# Patient Record
Sex: MALE | Employment: FULL TIME | URBAN - METROPOLITAN AREA
[De-identification: names, ages, dates, MRNs, and addresses within clinical notes are randomized per-mention and may not be internally consistent; named-entity substitution may affect disease eponyms.]

---

## 2024-07-29 ENCOUNTER — APPOINTMENT (OUTPATIENT)
Dept: RADIOLOGY | Facility: HOSPITAL | Age: 29
End: 2024-07-29
Payer: COMMERCIAL

## 2024-07-29 ENCOUNTER — HOSPITAL ENCOUNTER (EMERGENCY)
Facility: HOSPITAL | Age: 29
Discharge: HOME | End: 2024-07-30
Payer: COMMERCIAL

## 2024-07-29 DIAGNOSIS — R10.84 GENERALIZED ABDOMINAL PAIN: Primary | ICD-10-CM

## 2024-07-29 LAB
ALBUMIN SERPL BCP-MCNC: 4.9 G/DL (ref 3.4–5)
ALP SERPL-CCNC: 46 U/L (ref 33–120)
ALT SERPL W P-5'-P-CCNC: 23 U/L (ref 10–52)
ANION GAP SERPL CALC-SCNC: 13 MMOL/L (ref 10–20)
APPEARANCE UR: ABNORMAL
AST SERPL W P-5'-P-CCNC: 24 U/L (ref 9–39)
BASOPHILS # BLD AUTO: 0.06 X10*3/UL (ref 0–0.1)
BASOPHILS NFR BLD AUTO: 0.8 %
BILIRUB SERPL-MCNC: 0.3 MG/DL (ref 0–1.2)
BILIRUB UR STRIP.AUTO-MCNC: NEGATIVE MG/DL
BUN SERPL-MCNC: 11 MG/DL (ref 6–23)
CALCIUM SERPL-MCNC: 9.8 MG/DL (ref 8.6–10.3)
CHLORIDE SERPL-SCNC: 101 MMOL/L (ref 98–107)
CO2 SERPL-SCNC: 28 MMOL/L (ref 21–32)
COLOR UR: ABNORMAL
CREAT SERPL-MCNC: 1.06 MG/DL (ref 0.5–1.3)
EGFRCR SERPLBLD CKD-EPI 2021: >90 ML/MIN/1.73M*2
EOSINOPHIL # BLD AUTO: 0.1 X10*3/UL (ref 0–0.7)
EOSINOPHIL NFR BLD AUTO: 1.3 %
ERYTHROCYTE [DISTWIDTH] IN BLOOD BY AUTOMATED COUNT: 11.9 % (ref 11.5–14.5)
GLUCOSE SERPL-MCNC: 105 MG/DL (ref 74–99)
GLUCOSE UR STRIP.AUTO-MCNC: NORMAL MG/DL
HCT VFR BLD AUTO: 44.9 % (ref 41–52)
HGB BLD-MCNC: 16 G/DL (ref 13.5–17.5)
IMM GRANULOCYTES # BLD AUTO: 0.01 X10*3/UL (ref 0–0.7)
IMM GRANULOCYTES NFR BLD AUTO: 0.1 % (ref 0–0.9)
KETONES UR STRIP.AUTO-MCNC: NEGATIVE MG/DL
LACTATE SERPL-SCNC: 0.8 MMOL/L (ref 0.4–2)
LEUKOCYTE ESTERASE UR QL STRIP.AUTO: NEGATIVE
LIPASE SERPL-CCNC: 29 U/L (ref 9–82)
LYMPHOCYTES # BLD AUTO: 3.18 X10*3/UL (ref 1.2–4.8)
LYMPHOCYTES NFR BLD AUTO: 40.8 %
MCH RBC QN AUTO: 32.8 PG (ref 26–34)
MCHC RBC AUTO-ENTMCNC: 35.6 G/DL (ref 32–36)
MCV RBC AUTO: 92 FL (ref 80–100)
MONOCYTES # BLD AUTO: 0.55 X10*3/UL (ref 0.1–1)
MONOCYTES NFR BLD AUTO: 7.1 %
NEUTROPHILS # BLD AUTO: 3.9 X10*3/UL (ref 1.2–7.7)
NEUTROPHILS NFR BLD AUTO: 49.9 %
NITRITE UR QL STRIP.AUTO: NEGATIVE
NRBC BLD-RTO: 0 /100 WBCS (ref 0–0)
PH UR STRIP.AUTO: 7 [PH]
PLATELET # BLD AUTO: 324 X10*3/UL (ref 150–450)
POTASSIUM SERPL-SCNC: 3.5 MMOL/L (ref 3.5–5.3)
PROT SERPL-MCNC: 7.8 G/DL (ref 6.4–8.2)
PROT UR STRIP.AUTO-MCNC: NEGATIVE MG/DL
RBC # BLD AUTO: 4.88 X10*6/UL (ref 4.5–5.9)
RBC # UR STRIP.AUTO: NEGATIVE /UL
SODIUM SERPL-SCNC: 138 MMOL/L (ref 136–145)
SP GR UR STRIP.AUTO: 1.01
UROBILINOGEN UR STRIP.AUTO-MCNC: NORMAL MG/DL
WBC # BLD AUTO: 7.8 X10*3/UL (ref 4.4–11.3)

## 2024-07-29 PROCEDURE — 81003 URINALYSIS AUTO W/O SCOPE: CPT | Performed by: EMERGENCY MEDICINE

## 2024-07-29 PROCEDURE — 83605 ASSAY OF LACTIC ACID: CPT | Performed by: PHYSICIAN ASSISTANT

## 2024-07-29 PROCEDURE — 80053 COMPREHEN METABOLIC PANEL: CPT | Performed by: PHYSICIAN ASSISTANT

## 2024-07-29 PROCEDURE — 2500000004 HC RX 250 GENERAL PHARMACY W/ HCPCS (ALT 636 FOR OP/ED): Performed by: PHYSICIAN ASSISTANT

## 2024-07-29 PROCEDURE — 96360 HYDRATION IV INFUSION INIT: CPT

## 2024-07-29 PROCEDURE — 99284 EMERGENCY DEPT VISIT MOD MDM: CPT | Mod: 25

## 2024-07-29 PROCEDURE — 74177 CT ABD & PELVIS W/CONTRAST: CPT

## 2024-07-29 PROCEDURE — 74177 CT ABD & PELVIS W/CONTRAST: CPT | Performed by: RADIOLOGY

## 2024-07-29 PROCEDURE — 71260 CT THORAX DX C+: CPT | Performed by: RADIOLOGY

## 2024-07-29 PROCEDURE — 2550000001 HC RX 255 CONTRASTS: Performed by: PHYSICIAN ASSISTANT

## 2024-07-29 PROCEDURE — 83690 ASSAY OF LIPASE: CPT | Performed by: PHYSICIAN ASSISTANT

## 2024-07-29 PROCEDURE — 36415 COLL VENOUS BLD VENIPUNCTURE: CPT | Performed by: PHYSICIAN ASSISTANT

## 2024-07-29 PROCEDURE — 85025 COMPLETE CBC W/AUTO DIFF WBC: CPT | Performed by: PHYSICIAN ASSISTANT

## 2024-07-29 RX ADMIN — IOHEXOL 75 ML: 350 INJECTION, SOLUTION INTRAVENOUS at 23:05

## 2024-07-29 RX ADMIN — SODIUM CHLORIDE 1000 ML: 9 INJECTION, SOLUTION INTRAVENOUS at 23:08

## 2024-07-29 ASSESSMENT — COLUMBIA-SUICIDE SEVERITY RATING SCALE - C-SSRS
6. HAVE YOU EVER DONE ANYTHING, STARTED TO DO ANYTHING, OR PREPARED TO DO ANYTHING TO END YOUR LIFE?: NO
2. HAVE YOU ACTUALLY HAD ANY THOUGHTS OF KILLING YOURSELF?: NO
1. IN THE PAST MONTH, HAVE YOU WISHED YOU WERE DEAD OR WISHED YOU COULD GO TO SLEEP AND NOT WAKE UP?: NO

## 2024-07-29 ASSESSMENT — PAIN - FUNCTIONAL ASSESSMENT: PAIN_FUNCTIONAL_ASSESSMENT: 0-10

## 2024-07-29 ASSESSMENT — PAIN DESCRIPTION - PAIN TYPE: TYPE: ACUTE PAIN

## 2024-07-29 ASSESSMENT — PAIN DESCRIPTION - LOCATION: LOCATION: ABDOMEN

## 2024-07-29 ASSESSMENT — PAIN DESCRIPTION - ORIENTATION: ORIENTATION: RIGHT;INNER;LOWER

## 2024-07-30 VITALS
DIASTOLIC BLOOD PRESSURE: 71 MMHG | OXYGEN SATURATION: 95 % | HEIGHT: 71 IN | TEMPERATURE: 98.2 F | WEIGHT: 185 LBS | RESPIRATION RATE: 15 BRPM | BODY MASS INDEX: 25.9 KG/M2 | SYSTOLIC BLOOD PRESSURE: 122 MMHG | HEART RATE: 60 BPM

## 2024-07-30 NOTE — ED PROVIDER NOTES
HPI   Chief Complaint   Patient presents with    Abdominal Pain       History of present illness:  28-year-old male presents the emergency room for complaints of generalized abdominal pain.  The patient states that earlier tonight about 5 hours ago he began developing pain in his lower abdomen and suprapubic area.  He states he feels like there is a lump or something in his abdomen and states that his girlfriend who is at bedside also felt like there is something there.  He states he was as slight nausea but denies any diarrhea or fevers or chills or any other symptoms.  He has no previous medical history and denies any prior surgeries on his abdomen with the exception of an inguinal hernia repair done when he was an infant on the right side.  He states he has never had issues with denies any pain at this time in the abdomen.  Denies any other symptoms at this time.    Social history: Negative for alcohol and drug use.    Review of systems:   Gen.: No weight loss, fatigue, anorexia, insomnia, fever.   Eyes: No vision loss, double vision, drainage, eye pain.   ENT: No pharyngitis, dry mouth.   Cardiac: No chest pain, palpitations, syncope, near syncope.   Pulmonary: No shortness of breath, cough, hemoptysis.   Heme/lymph: No swollen glands, fever, bleeding.   GI: No  change in bowel habits, melena, hematemesis, hematochezia, nausea, vomiting, diarrhea.   : No discharge, dysuria, frequency, urgency, hematuria.   Musculoskeletal: No limb pain, joint pain, joint swelling.   Skin: No rashes.   Review of systems is otherwise negative unless stated above or in history of present illness.        Physical exam:  General: Vitals noted, no distress. Afebrile.   EENT: No lymphadenopathy appreciated  Cardiac: Regular, rate, rhythm, no murmur.   Pulmonary: Lungs clear bilaterally with good aeration. No adventitious breath sounds.   Abdomen: Soft, nonsurgical.  Tenderness palpation over the suprapubic area but no masses felt no  peritoneal signs. Normoactive bowel sounds.   Extremities: No peripheral edema.   Skin: No rash.   Neuro: No focal neurologic deficits                Medical decision making:   Testing: CBC CMP lipase lactate urinalysis CT scan pelvis with contrast shows no acute findings as interpreted by radiology  Treatment: IV fluids given  Reevaluation:   Plan: Home-going.  Discussed differential. Will follow-up with the primary physician in the next 2-3 days. Return if worse. They understand return precautions and discharge instructions. Patient and family/friend/caregiver are in agreement with this plan. 28-year-old male presents the emergency room for complaints of generalized abdominal pain.  The patient states that earlier tonight about 5 hours ago he began developing pain in his lower abdomen and suprapubic area.  He states he feels like there is a lump or something in his abdomen and states that his girlfriend who is at bedside also felt like there is something there.  He states he was as slight nausea but denies any diarrhea or fevers or chills or any other symptoms.  He has no previous medical history and denies any prior surgeries on his abdomen with the exception of an inguinal hernia repair done when he was an infant on the right side.  He states he has never had issues with denies any pain at this time in the abdomen.  Denies any other symptoms at this time.  On exam of the abdomen there is no mass or lump appreciated there is no obvious hernia present, bowel sounds are clear throughout the patient complains of some pain to palpation suprapubic area at this time.  Vitals are appropriate.  I explained to the patient that I believe that he is most likely suffering from possibly a cyst in the area but I do not appreciate thing on my exam at this time.  Explained to him that the CT scan was completely benign at this time and explained to him that I feel he could return home and use over-the-counter analgesics at this  time.  I encouraged him return the event he had any further problems.  He will follow-up with his primary care doctor.  Impression:   1.  Abdominal pain                    Patient History   No past medical history on file.  No past surgical history on file.  No family history on file.  Social History     Tobacco Use    Smoking status: Not on file    Smokeless tobacco: Not on file   Substance Use Topics    Alcohol use: Not on file    Drug use: Not on file       Physical Exam   ED Triage Vitals [07/29/24 2127]   Temperature Heart Rate Respirations BP   36.8 °C (98.2 °F) 77 18 153/83      Pulse Ox Temp Source Heart Rate Source Patient Position   98 % Temporal Monitor Sitting      BP Location FiO2 (%)     Left arm --       Physical Exam      ED Course & MDM   Diagnoses as of 07/30/24 1152   Generalized abdominal pain                       [place] Coma Scale Score: 15                        Medical Decision Making      Procedure  Procedures     Red Goyal PA-C  07/30/24 1154

## 2024-07-30 NOTE — ED TRIAGE NOTES
RLQ, medial aspect, abd px starting a couple hours ago. Describes at tight. H/o hernia and repair to that area as a child. Denies n/v. Px is 3/10.

## 2024-07-31 ENCOUNTER — OFFICE VISIT (OUTPATIENT)
Dept: PRIMARY CARE | Facility: CLINIC | Age: 29
End: 2024-07-31
Payer: COMMERCIAL

## 2024-07-31 VITALS — SYSTOLIC BLOOD PRESSURE: 125 MMHG | DIASTOLIC BLOOD PRESSURE: 66 MMHG

## 2024-07-31 DIAGNOSIS — R10.30 LOWER ABDOMINAL PAIN: Primary | ICD-10-CM

## 2024-07-31 PROCEDURE — 99203 OFFICE O/P NEW LOW 30 MIN: CPT | Performed by: FAMILY MEDICINE

## 2024-07-31 NOTE — PROGRESS NOTES
Subjective   Patient ID: Noam Burger is a 28 y.o. male who presents for low abd pain for 2 days  HPI   Patient started to have low abd pain for 2 days ago. No nausea, vomiting.  Pt started diarrhea 3 days ago.  No pus or blood in the stools. No fever or chills.  No HA, dizziness, imbalance, sob, cp, heart palpitation or cough. pt had normal  appetite. Patient was able to keep food and fluid down. Patient had normal urination.  Abd pain persisted in office today. Recent abd ct and labs were normal.     Review of Systems    Objective   /66     Physical Exam  No acute distress. Eyes: PERRLA, EMOI, moist mouth mucosa, no nasal discharge, No erythematous pharynx. No cervical or axillary lymph node tenderness or enlargement. Neck is supple. Lungs: CTA b/l, Heart: RRR, Abdomen: soft, low  abd tenderness. No guarding or rebound, Bowel sound: active. Pt walks with a good balance.     Assessment/Plan   Problem List Items Addressed This Visit             ICD-10-CM    Lower abdominal pain - Primary R10.30     New onset. Pt had hx of low abd wall hernia repair. Recommend surgeon nelda

## 2024-08-06 ENCOUNTER — APPOINTMENT (OUTPATIENT)
Dept: SURGERY | Facility: CLINIC | Age: 29
End: 2024-08-06
Payer: COMMERCIAL

## 2024-08-06 VITALS
SYSTOLIC BLOOD PRESSURE: 121 MMHG | HEART RATE: 53 BPM | OXYGEN SATURATION: 97 % | WEIGHT: 178.9 LBS | DIASTOLIC BLOOD PRESSURE: 74 MMHG | HEIGHT: 71 IN | BODY MASS INDEX: 25.05 KG/M2

## 2024-08-06 DIAGNOSIS — R10.30 LOWER ABDOMINAL PAIN: Primary | ICD-10-CM

## 2024-08-06 PROCEDURE — 99203 OFFICE O/P NEW LOW 30 MIN: CPT | Performed by: SURGERY

## 2024-08-06 PROCEDURE — 3008F BODY MASS INDEX DOCD: CPT | Performed by: SURGERY

## 2024-08-06 NOTE — PROGRESS NOTES
General Surgery History and Physical    Referring Provider:  Myriam Landers MD PhD  Myriam Landers MD PhD     Chief Complaint:  Chief Complaint   Patient presents with    New Patient Visit     Eval for hernia/ abdominal pain       History of Present Illness:  Noam Burger is a 28 y.o. male   History of lower abdominal wall hernia repair at age of 4 (? Mesh used per Mom)  Was in ED on 7/29 for abdominal pain and bulge in the lower abdomen and suprapubic area, more on right side  Had CT scan which was not remarkable. No hernia defect noted either.   Was seen by PCP Dr Landers on 7/31 and sent here for further evaluation.   No pain or bulge currently         Past Medical History:  No past medical history on file.     Past Surgical History:  No past surgical history on file.     Medications:  No current outpatient medications     Allergies:  No Known Allergies     Family History:  No family history on file.     Social History:  Social History     Socioeconomic History    Marital status: Single   Tobacco Use    Smoking status: Never    Smokeless tobacco: Current     Types: Chew   Substance and Sexual Activity    Alcohol use: Not Currently    Drug use: Yes     Types: Marijuana        Review of Systems:  A complete 12 point review of systems was performed. Please see scanned questionnaire and is otherwise negative except as noted in the history of present illness.    Vital Signs:  Vitals:    08/06/24 0843   BP: 121/74   Pulse: 53   SpO2: 97%      Body mass index is 24.95 kg/m².      Physical Exam:  General: No acute distress.   Neuro: Alert and oriented ×3. Follows commands.  Face: Atraumatic, no visible skin lesion.   Head: Atraumatic  Eyes: Pupils equal reactive to light. Extraocular motions intact.  Ears: Hears normal speaking voice.  Mouth, Nose, Throat: Mucous membranes moist.  Normal dentition.  Neck: Supple. No visible masses.  Breast: Not examined.   Lung: Patent airway and no labored breath.   Vascular: Palpable radial  pulses bilaterally.  Abdomen: Soft, NT,ND. No bulge felt in the area of concern.   Rectal and Perianal: Not examined.   Genitourinary: Not examined.   Musculoskeletal: Moves all extremities.  Normal range of motion.  Extremities: No cyanosis. No edema.   Lymphatic: No palpable lymph nodes.  Skin: No rashes or lesions.  Psychological: Normal affect      Laboratory Values:  CBC:   Lab Results   Component Value Date    WBC 7.8 07/29/2024    RBC 4.88 07/29/2024    HGB 16.0 07/29/2024    HCT 44.9 07/29/2024     07/29/2024       RFP:   Lab Results   Component Value Date     07/29/2024    K 3.5 07/29/2024     07/29/2024    CO2 28 07/29/2024    BUN 11 07/29/2024    CREATININE 1.06 07/29/2024    CALCIUM 9.8 07/29/2024        LFTs:   Lab Results   Component Value Date    PROT 7.8 07/29/2024    ALBUMIN 4.9 07/29/2024    BILITOT 0.3 07/29/2024    ALKPHOS 46 07/29/2024    AST 24 07/29/2024    ALT 23 07/29/2024            Imaging:  I have personally reviewed the images and the radiologist's report.  CT abdomen pelvis w IV contrast    Result Date: 7/29/2024  Interpreted By:  Scott Barry, STUDY: CT ABDOMEN PELVIS W IV CONTRAST;  7/29/2024 11:03 pm   INDICATION: Signs/Symptoms:suprapubic abdominal pain that began about 4 hours ago, pain to palpation.   COMPARISON: None.   ACCESSION NUMBER(S): [id number]   ORDERING CLINICIAN: OPAL MCGOWAN   TECHNIQUE: Contiguous axial images of the chest, abdomen, and pelvis were obtained after intravenous contrast contrast.  Coronal and sagittal reformatted images were reconstructed from the axial data.     FINDINGS:     CT ABDOMEN/PELVIS:   ABDOMINAL WALL: No acute abnormality.   LIVER: No significant parenchymal abnormality.   BILE DUCTS: No significant intrahepatic or extrahepatic dilatation.   GALLBLADDER: No significant abnormality.   SPLEEN: No significant abnormality.   PANCREAS: No significant abnormality.   ADRENALS: No significant abnormality.   KIDNEYS, URETERS,  BLADDER: No significant abnormality.   REPRODUCTIVE ORGANS: No significant abnormality.   VESSELS: No significant abnormality.   LYMPH NODES/RETROPERITONEUM: No enlarged lymph nodes.   BOWEL/MESENTERY/PERITONEUM:  No bowel wall thickening or dilatation. Normal appendix. No ascites, free air or fluid collection.   MUSCULOSKELETAL: No acute osseous abnormality.         No acute intra-abdominal pathology detected. Normal appendix. Urinary bladder appears unremarkable.   Signed by: Scott Barry 7/29/2024 11:39 PM Dictation workstation:   [id number]        Assessment:  This is a 28 y.o. male   History of lower abdominal wall hernia repair in childhood   Recent ED visit for pain and bulge in suprapubic area   CT not remarkable   No bulge on exam either     Plan:  Recommended to monitor   Instructed to come back or ED if pain/bulge recurs. May need repeat scan or US with Valsalva maneuver.       Darin Sepulveda MD [facility]  General Surgery  Office: [phone]  Fax:     [phone]  8:55 AM   08/06/24